# Patient Record
Sex: FEMALE | Race: WHITE | HISPANIC OR LATINO | Employment: UNEMPLOYED | URBAN - METROPOLITAN AREA
[De-identification: names, ages, dates, MRNs, and addresses within clinical notes are randomized per-mention and may not be internally consistent; named-entity substitution may affect disease eponyms.]

---

## 2021-01-21 ENCOUNTER — LAB VISIT (OUTPATIENT)
Dept: PRIMARY CARE CLINIC | Facility: OTHER | Age: 36
End: 2021-01-21
Attending: INTERNAL MEDICINE
Payer: OTHER GOVERNMENT

## 2021-01-21 DIAGNOSIS — M79.10 MUSCLE PAIN: ICD-10-CM

## 2021-01-21 DIAGNOSIS — R43.9 PROBLEMS WITH SMELL AND TASTE: ICD-10-CM

## 2021-01-21 DIAGNOSIS — R50.9 FEVER: ICD-10-CM

## 2021-01-21 PROCEDURE — U0003 INFECTIOUS AGENT DETECTION BY NUCLEIC ACID (DNA OR RNA); SEVERE ACUTE RESPIRATORY SYNDROME CORONAVIRUS 2 (SARS-COV-2) (CORONAVIRUS DISEASE [COVID-19]), AMPLIFIED PROBE TECHNIQUE, MAKING USE OF HIGH THROUGHPUT TECHNOLOGIES AS DESCRIBED BY CMS-2020-01-R: HCPCS

## 2021-01-22 LAB — SARS-COV-2 RNA RESP QL NAA+PROBE: DETECTED

## 2021-01-24 DIAGNOSIS — U07.1 COVID-19 VIRUS DETECTED: ICD-10-CM

## 2022-08-10 PROBLEM — M54.30 SCIATICA: Status: ACTIVE | Noted: 2022-08-10

## 2022-08-30 PROBLEM — O30.032 MONOCHORIONIC DIAMNIOTIC TWIN GESTATION IN SECOND TRIMESTER: Status: ACTIVE | Noted: 2022-08-30

## 2022-09-06 PROBLEM — O09.522 AMA (ADVANCED MATERNAL AGE) MULTIGRAVIDA 35+, SECOND TRIMESTER: Status: ACTIVE | Noted: 2022-09-06

## 2022-09-16 ENCOUNTER — CLINICAL SUPPORT (OUTPATIENT)
Dept: PEDIATRIC CARDIOLOGY | Facility: CLINIC | Age: 37
End: 2022-09-16
Payer: MEDICAID

## 2022-09-16 ENCOUNTER — OFFICE VISIT (OUTPATIENT)
Dept: PEDIATRIC CARDIOLOGY | Facility: CLINIC | Age: 37
End: 2022-09-16
Attending: PEDIATRICS
Payer: MEDICAID

## 2022-09-16 VITALS
HEART RATE: 87 BPM | SYSTOLIC BLOOD PRESSURE: 111 MMHG | BODY MASS INDEX: 24.72 KG/M2 | WEIGHT: 144.81 LBS | OXYGEN SATURATION: 95 % | HEIGHT: 64 IN | DIASTOLIC BLOOD PRESSURE: 61 MMHG

## 2022-09-16 DIAGNOSIS — O30.032 MONOCHORIONIC DIAMNIOTIC TWIN GESTATION IN SECOND TRIMESTER: Primary | ICD-10-CM

## 2022-09-16 DIAGNOSIS — O30.032 MONOCHORIONIC DIAMNIOTIC TWIN GESTATION IN SECOND TRIMESTER: ICD-10-CM

## 2022-09-16 PROCEDURE — 76827 ECHO EXAM OF FETAL HEART: CPT | Mod: 26,S$PBB,, | Performed by: PEDIATRICS

## 2022-09-16 PROCEDURE — 76825 PR  SO2 FETAL HEART: ICD-10-PCS | Mod: 26,59,S$PBB, | Performed by: PEDIATRICS

## 2022-09-16 PROCEDURE — 99999 PR PBB SHADOW E&M-EST. PATIENT-LVL III: ICD-10-PCS | Mod: PBBFAC,,, | Performed by: PEDIATRICS

## 2022-09-16 PROCEDURE — 76825 ECHO EXAM OF FETAL HEART: CPT | Mod: 26,59,S$PBB, | Performed by: PEDIATRICS

## 2022-09-16 PROCEDURE — 1159F MED LIST DOCD IN RCRD: CPT | Mod: CPTII,,, | Performed by: PEDIATRICS

## 2022-09-16 PROCEDURE — 99213 OFFICE O/P EST LOW 20 MIN: CPT | Mod: PBBFAC,25 | Performed by: PEDIATRICS

## 2022-09-16 PROCEDURE — 99999 PR PBB SHADOW E&M-EST. PATIENT-LVL III: CPT | Mod: PBBFAC,,, | Performed by: PEDIATRICS

## 2022-09-16 PROCEDURE — 76827 PR  SO2 FETAL HEART DOPPLER: ICD-10-PCS | Mod: 26,S$PBB,, | Performed by: PEDIATRICS

## 2022-09-16 PROCEDURE — 76827 PR  SO2 FETAL HEART DOPPLER: ICD-10-PCS | Mod: 26,59,S$PBB, | Performed by: PEDIATRICS

## 2022-09-16 PROCEDURE — 93325 DOPPLER ECHO COLOR FLOW MAPG: CPT | Mod: 59,PBBFAC | Performed by: PEDIATRICS

## 2022-09-16 PROCEDURE — 3074F SYST BP LT 130 MM HG: CPT | Mod: CPTII,,, | Performed by: PEDIATRICS

## 2022-09-16 PROCEDURE — 76827 ECHO EXAM OF FETAL HEART: CPT | Mod: 26,59,S$PBB, | Performed by: PEDIATRICS

## 2022-09-16 PROCEDURE — 76825 ECHO EXAM OF FETAL HEART: CPT | Mod: 59,PBBFAC | Performed by: PEDIATRICS

## 2022-09-16 PROCEDURE — 93325 DOPPLER ECHO COLOR FLOW MAPG: CPT | Mod: 26,59,S$PBB, | Performed by: PEDIATRICS

## 2022-09-16 PROCEDURE — 76827 ECHO EXAM OF FETAL HEART: CPT | Mod: 59,PBBFAC | Performed by: PEDIATRICS

## 2022-09-16 PROCEDURE — 3008F PR BODY MASS INDEX (BMI) DOCUMENTED: ICD-10-PCS | Mod: CPTII,,, | Performed by: PEDIATRICS

## 2022-09-16 PROCEDURE — 3008F BODY MASS INDEX DOCD: CPT | Mod: CPTII,,, | Performed by: PEDIATRICS

## 2022-09-16 PROCEDURE — 93325 DOPPLER ECHO COLOR FLOW MAPG: CPT | Mod: PBBFAC | Performed by: PEDIATRICS

## 2022-09-16 PROCEDURE — 1159F PR MEDICATION LIST DOCUMENTED IN MEDICAL RECORD: ICD-10-PCS | Mod: CPTII,,, | Performed by: PEDIATRICS

## 2022-09-16 PROCEDURE — 3074F PR MOST RECENT SYSTOLIC BLOOD PRESSURE < 130 MM HG: ICD-10-PCS | Mod: CPTII,,, | Performed by: PEDIATRICS

## 2022-09-16 PROCEDURE — 93325 DOPPLER ECHO COLOR FLOW MAPG: CPT | Mod: 26,S$PBB,, | Performed by: PEDIATRICS

## 2022-09-16 PROCEDURE — 99203 OFFICE O/P NEW LOW 30 MIN: CPT | Mod: 25,S$PBB,, | Performed by: PEDIATRICS

## 2022-09-16 PROCEDURE — 76825 ECHO EXAM OF FETAL HEART: CPT | Mod: 26,S$PBB,, | Performed by: PEDIATRICS

## 2022-09-16 PROCEDURE — 99203 PR OFFICE/OUTPT VISIT, NEW, LEVL III, 30-44 MIN: ICD-10-PCS | Mod: 25,S$PBB,, | Performed by: PEDIATRICS

## 2022-09-16 PROCEDURE — 93325 PR DOPPLER COLOR FLOW VELOCITY MAP: ICD-10-PCS | Mod: 26,59,S$PBB, | Performed by: PEDIATRICS

## 2022-09-16 PROCEDURE — 76825 ECHO EXAM OF FETAL HEART: CPT | Mod: PBBFAC | Performed by: PEDIATRICS

## 2022-09-16 PROCEDURE — 93325 PR DOPPLER COLOR FLOW VELOCITY MAP: ICD-10-PCS | Mod: 26,S$PBB,, | Performed by: PEDIATRICS

## 2022-09-16 PROCEDURE — 3078F PR MOST RECENT DIASTOLIC BLOOD PRESSURE < 80 MM HG: ICD-10-PCS | Mod: CPTII,,, | Performed by: PEDIATRICS

## 2022-09-16 PROCEDURE — 76825 PR  SO2 FETAL HEART: ICD-10-PCS | Mod: 26,S$PBB,, | Performed by: PEDIATRICS

## 2022-09-16 PROCEDURE — 3078F DIAST BP <80 MM HG: CPT | Mod: CPTII,,, | Performed by: PEDIATRICS

## 2022-09-16 PROCEDURE — 76827 ECHO EXAM OF FETAL HEART: CPT | Mod: PBBFAC | Performed by: PEDIATRICS

## 2022-09-16 RX ORDER — ASPIRIN 81 MG/1
81 TABLET ORAL DAILY
Status: ON HOLD | COMMUNITY
End: 2022-12-24

## 2022-09-16 NOTE — PROGRESS NOTES
"Patient accompanied by her .  Patient states she "understands English very well."  Patient's 's first language is English.  Patient was able to answer all Nurse's questions in English.   was dismissed.  Sydnie BERGER    "

## 2022-09-16 NOTE — LETTER
September 16, 2022        Sean Rizo MD  [address] [place] [address]  [address] [place] [id number]             [facility] ([place])  [address]LO[address]  [facility] [address]  Phone: [phone]  Fax: [phone]   Patient: Sheila Godwin   MR Number: [id number]   YOB: 1985   Date of Visit: 9/16/2022       Dear Dr. Rizo:    Thank you for referring Sheila Godwin to me for evaluation. Attached you will find relevant portions of my assessment and plan of care.    If you have questions, please do not hesitate to call me. I look forward to following Sheila Godwin along with you.    Sincerely,      Fany Bell MD            CC  Case Self MD    Enclosure

## 2022-09-16 NOTE — PROGRESS NOTES
[facility] ([place] Fetal Cardiology Clinic    Today, I had the pleasure of evaluating Sheila Godwin who is now 36 y.o. and carrying her third pregnancy at 22 4/7 weeks gestation with an AVIVA of 23. She was referred for evaluation of the fetal heart due mono-di twin gestation.      She is carrying a male fetuses.  Pregnancy thus far has been otherwise uncomplicated. Thus far, no concern for twin-twin transfusion.     Obstetric History:    .  Her OB history is otherwise unremarkable.     History reviewed. No pertinent past medical history.      Current Outpatient Medications:     aspirin (ECOTRIN) 81 MG EC tablet, Take 81 mg by mouth once daily., Disp: , Rfl:     folic acid (FOLVITE) 1 MG tablet, Take 1 mg by mouth once daily., Disp: , Rfl:     nitrofurantoin monohyd/m-cryst (MACROBID ORAL), Take 100 mg by mouth once daily., Disp: , Rfl:     prenatal vit/iron fum/folic ac (PRENATAL 1+1 ORAL), Take by mouth., Disp: , Rfl:     ibuprofen (ADVIL,MOTRIN) 600 MG tablet, Take 1 tablet (600 mg total) by mouth every 6 (six) hours as needed for Pain. (Patient not taking: Reported on 2022), Disp: 20 tablet, Rfl: 0     Review of patient's allergies indicates:  No Known Allergies    Family History: Negative for congenital heart disease, early coronary artery disease, sudden unexplained death, connective tissues disorders, genetic syndromes, multiple miscarriages or other congenital anomalies.    Social History: Ms. Sheila Godwin is  to the father of the baby/single.  The father of the baby is involved.     FETAL ECHOCARDIOGRAM (summary):  Fetus A, vertex, maternal left  Fetal echocardiogram at 22 4/7wga, AVIVA 23.  Normal fetal echocardiogram.    Fetus B, breech, maternal right  Fetal echocardiogram at 22 4/7wga, AVIVA 23.   Normal fetal echocardiogram.  (Full report in electronic medical record)    Impression:  Twin active male fetus at 22 4/7  wga.  Mono-di twin gestation. Normal fetal echocardiograms      Todays fetal echocardiogram is normal, within the limitations of fetal echocardiography.  I discussed with her that fetal echocardiography is insufficiently sensitive to rule out all septal defects, anomalies of pulmonary and systemic veins, arch anomalies, and some valvar abnormalities, nor can it ensure that the ductus arteriosus and foramen ovale will spontaneously close.     Recommendations:  Location, timing, and mode of delivery will be determined by the obstetrical team.  She does not require further follow-up in the fetal echocardiography clinic, but I would be happy to see her again if additional questions or concerns arise.    Should there be any concerns about the baby's heart after birth, a post- echocardiogram and cardiology consultation are recommended.         Fany Bell MD, MSCI  Pediatric Cardiology  Pediatric Echocardiography, Fetal Echocardiography, Cardiac MRI  [facility] [address] [place]  [address]  Phone [phone], Fax [phone]      The above information was discussed in detail including the use of diagrams, with 30 minutes of total face to face time, with greater than 50% with counseling and coordination of care.  The discussion of the diagnosis and treatment options is as described above.

## 2022-10-12 ENCOUNTER — TELEPHONE (OUTPATIENT)
Dept: MATERNAL FETAL MEDICINE | Facility: CLINIC | Age: 37
End: 2022-10-12
Payer: MEDICAID

## 2022-10-12 NOTE — TELEPHONE ENCOUNTER
RN called patient to see what exactly see needed. Patient stated Dr Self needed to speak with PT about clearing her for a steroid shot. RN spoke with Arely Jimenez PT who said she would speak with Aramis if needed. RN relayed this message to Dr Self who said he will speak with Arely Jimenez at some point this week.           ----- Message from Joseph Dennis sent at 10/12/2022 10:27 AM CDT -----  Name of Who is Calling:therapy on behalf of  CORINNE YI [[id number]]            What is the request in detail: Patient is requesting call back to insure steroid injection              Can the clinic reply by [facility]: no              [facility]: [phone] arely jimenez (can secure chat) therapy

## 2022-10-31 PROBLEM — J06.9 URI (UPPER RESPIRATORY INFECTION): Status: ACTIVE | Noted: 2022-10-31

## 2022-10-31 PROBLEM — N39.0 CHRONIC UTI: Status: ACTIVE | Noted: 2022-10-31

## 2022-10-31 PROBLEM — O47.03 FALSE LABOR BEFORE 37 COMPLETED WEEKS OF GESTATION IN THIRD TRIMESTER: Status: ACTIVE | Noted: 2022-10-31

## 2022-10-31 PROBLEM — O30.003 TWIN GESTATION IN THIRD TRIMESTER: Status: ACTIVE | Noted: 2022-10-31

## 2022-10-31 PROBLEM — M53.3: Status: ACTIVE | Noted: 2022-10-31

## 2022-10-31 PROBLEM — Z3A.29 29 WEEKS GESTATION OF PREGNANCY: Status: ACTIVE | Noted: 2022-10-31

## 2022-10-31 PROBLEM — O23.43 URINARY TRACT INFECTION IN MOTHER DURING THIRD TRIMESTER OF PREGNANCY: Status: ACTIVE | Noted: 2022-10-31

## 2022-10-31 PROBLEM — O99.891: Status: ACTIVE | Noted: 2022-10-31

## 2022-11-15 PROBLEM — O30.033 MONOCHORIONIC DIAMNIOTIC TWIN GESTATION IN THIRD TRIMESTER: Status: ACTIVE | Noted: 2022-08-30

## 2022-12-24 PROBLEM — Z34.90 ENCOUNTER FOR ELECTIVE INDUCTION OF LABOR: Status: ACTIVE | Noted: 2022-12-24

## 2023-01-30 PROBLEM — O23.43 URINARY TRACT INFECTION IN MOTHER DURING THIRD TRIMESTER OF PREGNANCY: Status: RESOLVED | Noted: 2022-10-31 | Resolved: 2023-01-30

## 2023-01-30 PROBLEM — N39.0 CHRONIC UTI: Status: RESOLVED | Noted: 2022-10-31 | Resolved: 2023-01-30

## 2023-10-09 PROBLEM — Z3A.29 29 WEEKS GESTATION OF PREGNANCY: Status: RESOLVED | Noted: 2022-10-31 | Resolved: 2023-10-09
